# Patient Record
Sex: FEMALE | URBAN - METROPOLITAN AREA
[De-identification: names, ages, dates, MRNs, and addresses within clinical notes are randomized per-mention and may not be internally consistent; named-entity substitution may affect disease eponyms.]

---

## 2020-07-02 ENCOUNTER — APPOINTMENT (RX ONLY)
Dept: URBAN - METROPOLITAN AREA CLINIC 174 | Facility: CLINIC | Age: 45
Setting detail: DERMATOLOGY
End: 2020-07-02

## 2020-07-02 DIAGNOSIS — L29.89 OTHER PRURITUS: ICD-10-CM

## 2020-07-02 DIAGNOSIS — L30.9 DERMATITIS, UNSPECIFIED: ICD-10-CM

## 2020-07-02 DIAGNOSIS — L29.8 OTHER PRURITUS: ICD-10-CM

## 2020-07-02 PROCEDURE — ? COUNSELING

## 2020-07-02 PROCEDURE — 99203 OFFICE O/P NEW LOW 30 MIN: CPT

## 2020-07-02 PROCEDURE — ? TREATMENT REGIMEN

## 2020-07-02 PROCEDURE — ? PRESCRIPTION

## 2020-07-02 RX ORDER — LORATADINE 10 MG/1
1 CAPSULE, LIQUID FILLED ORAL QD
Qty: 30 | Refills: 1 | Status: ERX | COMMUNITY
Start: 2020-07-02

## 2020-07-02 RX ORDER — TRIAMCINOLONE ACETONIDE 1 MG/G
1 APPLICATION OINTMENT TOPICAL BID
Qty: 1 | Refills: 2 | Status: ERX | COMMUNITY
Start: 2020-07-02

## 2020-07-02 RX ADMIN — LORATADINE 1: 10 CAPSULE, LIQUID FILLED ORAL at 00:00

## 2020-07-02 RX ADMIN — TRIAMCINOLONE ACETONIDE 1 APPLICATION: 1 OINTMENT TOPICAL at 00:00

## 2020-07-02 ASSESSMENT — LOCATION DETAILED DESCRIPTION DERM
LOCATION DETAILED: RIGHT ANTERIOR PROXIMAL THIGH
LOCATION DETAILED: RIGHT PROXIMAL POSTERIOR THIGH
LOCATION DETAILED: RIGHT VENTRAL PROXIMAL FOREARM
LOCATION DETAILED: LEFT ANTERIOR PROXIMAL THIGH

## 2020-07-02 ASSESSMENT — LOCATION SIMPLE DESCRIPTION DERM
LOCATION SIMPLE: LEFT THIGH
LOCATION SIMPLE: RIGHT FOREARM
LOCATION SIMPLE: RIGHT POSTERIOR THIGH
LOCATION SIMPLE: RIGHT THIGH

## 2020-07-02 ASSESSMENT — LOCATION ZONE DERM
LOCATION ZONE: LEG
LOCATION ZONE: ARM

## 2020-07-02 NOTE — HPI: RASH
What Type Of Note Output Would You Prefer (Optional)?: Bullet Format
How Severe Is Your Rash?: mild
Is This A New Presentation, Or A Follow-Up?: Rash
Additional History: No hot tub use\\n- goes to park

## 2020-07-02 NOTE — PROCEDURE: TREATMENT REGIMEN
Detail Level: Zone
Plan: Use TAC for 2 weeks and if not better, come back for bx
Plan: Likely arthropod v papular urticaria

## 2020-08-06 ENCOUNTER — APPOINTMENT (RX ONLY)
Dept: URBAN - METROPOLITAN AREA CLINIC 377 | Facility: CLINIC | Age: 45
Setting detail: DERMATOLOGY
End: 2020-08-06

## 2020-08-06 DIAGNOSIS — L29.8 OTHER PRURITUS: ICD-10-CM

## 2020-08-06 DIAGNOSIS — L81.0 POSTINFLAMMATORY HYPERPIGMENTATION: ICD-10-CM

## 2020-08-06 DIAGNOSIS — L30.9 DERMATITIS, UNSPECIFIED: ICD-10-CM

## 2020-08-06 DIAGNOSIS — L29.89 OTHER PRURITUS: ICD-10-CM

## 2020-08-06 PROCEDURE — ? PRESCRIPTION

## 2020-08-06 PROCEDURE — ? COUNSELING

## 2020-08-06 PROCEDURE — 99213 OFFICE O/P EST LOW 20 MIN: CPT

## 2020-08-06 PROCEDURE — ? TREATMENT REGIMEN

## 2020-08-06 RX ORDER — CETIRIZINE HCL 10 MG
CAPSULE ORAL QD
Qty: 30 | Refills: 1 | Status: ERX | COMMUNITY
Start: 2020-08-06

## 2020-08-06 RX ADMIN — Medication: at 00:00

## 2020-08-06 ASSESSMENT — LOCATION DETAILED DESCRIPTION DERM
LOCATION DETAILED: LEFT PROXIMAL PRETIBIAL REGION
LOCATION DETAILED: RIGHT PROXIMAL POSTERIOR THIGH
LOCATION DETAILED: LEFT DISTAL POSTERIOR THIGH
LOCATION DETAILED: RIGHT ANTERIOR PROXIMAL THIGH
LOCATION DETAILED: EPIGASTRIC SKIN
LOCATION DETAILED: RIGHT DISTAL POSTERIOR THIGH
LOCATION DETAILED: LEFT SUPERIOR LATERAL MIDBACK
LOCATION DETAILED: RIGHT RIB CAGE
LOCATION DETAILED: RIGHT PROXIMAL PRETIBIAL REGION
LOCATION DETAILED: RIGHT VENTRAL PROXIMAL FOREARM
LOCATION DETAILED: RIGHT MID-UPPER BACK
LOCATION DETAILED: LEFT ANTERIOR PROXIMAL THIGH

## 2020-08-06 ASSESSMENT — LOCATION ZONE DERM
LOCATION ZONE: TRUNK
LOCATION ZONE: ARM
LOCATION ZONE: LEG

## 2020-08-06 ASSESSMENT — LOCATION SIMPLE DESCRIPTION DERM
LOCATION SIMPLE: RIGHT UPPER BACK
LOCATION SIMPLE: RIGHT FOREARM
LOCATION SIMPLE: RIGHT THIGH
LOCATION SIMPLE: ABDOMEN
LOCATION SIMPLE: LEFT POSTERIOR THIGH
LOCATION SIMPLE: LEFT PRETIBIAL REGION
LOCATION SIMPLE: LEFT LOWER BACK
LOCATION SIMPLE: LEFT THIGH
LOCATION SIMPLE: RIGHT PRETIBIAL REGION
LOCATION SIMPLE: RIGHT POSTERIOR THIGH

## 2020-08-06 NOTE — PROCEDURE: COUNSELING
Detail Level: Detailed
Patient Specific Counseling (Will Not Stick From Patient To Patient): reassured\\nNO Procedure done

## 2020-08-06 NOTE — PROCEDURE: TREATMENT REGIMEN
Plan: Likely arthropod v papular urticaria\\nAwait bx result from  done at same appt today\\nchange antihist too ceterizine\\nshe may try husbands fluocin\\nsome language barrier with  and wife---I may get an interpretor
Detail Level: Zone